# Patient Record
Sex: MALE | URBAN - METROPOLITAN AREA
[De-identification: names, ages, dates, MRNs, and addresses within clinical notes are randomized per-mention and may not be internally consistent; named-entity substitution may affect disease eponyms.]

---

## 2022-06-09 PROBLEM — Z00.00 ENCOUNTER FOR PREVENTIVE HEALTH EXAMINATION: Status: ACTIVE | Noted: 2022-06-09

## 2024-11-06 ENCOUNTER — EMERGENCY (EMERGENCY)
Facility: HOSPITAL | Age: 84
LOS: 1 days | Discharge: HOME | End: 2024-11-06
Attending: EMERGENCY MEDICINE | Admitting: EMERGENCY MEDICINE
Payer: MEDICARE

## 2024-11-06 ENCOUNTER — NON-APPOINTMENT (OUTPATIENT)
Age: 84
End: 2024-11-06

## 2024-11-06 ENCOUNTER — APPOINTMENT (OUTPATIENT)
Dept: OTOLARYNGOLOGY | Facility: CLINIC | Age: 84
End: 2024-11-06
Payer: MEDICARE

## 2024-11-06 VITALS
DIASTOLIC BLOOD PRESSURE: 78 MMHG | SYSTOLIC BLOOD PRESSURE: 159 MMHG | TEMPERATURE: 98 F | HEART RATE: 79 BPM | OXYGEN SATURATION: 97 % | RESPIRATION RATE: 18 BRPM

## 2024-11-06 VITALS
HEIGHT: 69 IN | SYSTOLIC BLOOD PRESSURE: 163 MMHG | DIASTOLIC BLOOD PRESSURE: 88 MMHG | HEART RATE: 76 BPM | TEMPERATURE: 98 F | RESPIRATION RATE: 20 BRPM | WEIGHT: 184.97 LBS | OXYGEN SATURATION: 98 %

## 2024-11-06 VITALS
WEIGHT: 185 LBS | HEIGHT: 69 IN | HEART RATE: 81 BPM | BODY MASS INDEX: 27.4 KG/M2 | DIASTOLIC BLOOD PRESSURE: 95 MMHG | SYSTOLIC BLOOD PRESSURE: 172 MMHG

## 2024-11-06 DIAGNOSIS — D68.9 POISONING BY ANTICOAGULANT ANTAGONISTS, VITAMIN K AND OTHER COAGULANTS, ACCIDENTAL (UNINTENTIONAL), INITIAL ENCOUNTER: ICD-10-CM

## 2024-11-06 DIAGNOSIS — I95.81 POSTPROCEDURAL HYPOTENSION: ICD-10-CM

## 2024-11-06 DIAGNOSIS — J31.0 CHRONIC RHINITIS: ICD-10-CM

## 2024-11-06 DIAGNOSIS — H65.02 ACUTE SEROUS OTITIS MEDIA, LEFT EAR: ICD-10-CM

## 2024-11-06 DIAGNOSIS — H93.8X9 OTHER SPECIFIED DISORDERS OF EAR, UNSPECIFIED EAR: ICD-10-CM

## 2024-11-06 DIAGNOSIS — R04.0 EPISTAXIS: ICD-10-CM

## 2024-11-06 DIAGNOSIS — H69.93 UNSPECIFIED EUSTACHIAN TUBE DISORDER, BILATERAL: ICD-10-CM

## 2024-11-06 DIAGNOSIS — T45.7X1A POISONING BY ANTICOAGULANT ANTAGONISTS, VITAMIN K AND OTHER COAGULANTS, ACCIDENTAL (UNINTENTIONAL), INITIAL ENCOUNTER: ICD-10-CM

## 2024-11-06 DIAGNOSIS — R55 SYNCOPE AND COLLAPSE: ICD-10-CM

## 2024-11-06 DIAGNOSIS — J34.2 DEVIATED NASAL SEPTUM: ICD-10-CM

## 2024-11-06 LAB
ALBUMIN SERPL ELPH-MCNC: 3.7 G/DL — SIGNIFICANT CHANGE UP (ref 3.3–5)
ALP SERPL-CCNC: 95 U/L — SIGNIFICANT CHANGE UP (ref 40–120)
ALT FLD-CCNC: 25 U/L — SIGNIFICANT CHANGE UP (ref 12–78)
ANION GAP SERPL CALC-SCNC: 3 MMOL/L — LOW (ref 5–17)
AST SERPL-CCNC: 21 U/L — SIGNIFICANT CHANGE UP (ref 15–37)
BASOPHILS # BLD AUTO: 0.01 K/UL — SIGNIFICANT CHANGE UP (ref 0–0.2)
BASOPHILS NFR BLD AUTO: 0.2 % — SIGNIFICANT CHANGE UP (ref 0–2)
BILIRUB SERPL-MCNC: 0.4 MG/DL — SIGNIFICANT CHANGE UP (ref 0.2–1.2)
BUN SERPL-MCNC: 18 MG/DL — SIGNIFICANT CHANGE UP (ref 7–23)
CALCIUM SERPL-MCNC: 9.7 MG/DL — SIGNIFICANT CHANGE UP (ref 8.5–10.1)
CHLORIDE SERPL-SCNC: 110 MMOL/L — HIGH (ref 96–108)
CO2 SERPL-SCNC: 27 MMOL/L — SIGNIFICANT CHANGE UP (ref 22–31)
CREAT SERPL-MCNC: 1.2 MG/DL — SIGNIFICANT CHANGE UP (ref 0.5–1.3)
EGFR: 59 ML/MIN/1.73M2 — LOW
EOSINOPHIL # BLD AUTO: 0.05 K/UL — SIGNIFICANT CHANGE UP (ref 0–0.5)
EOSINOPHIL NFR BLD AUTO: 0.8 % — SIGNIFICANT CHANGE UP (ref 0–6)
GLUCOSE SERPL-MCNC: 135 MG/DL — HIGH (ref 70–99)
HCT VFR BLD CALC: 38.3 % — LOW (ref 39–50)
HGB BLD-MCNC: 13.4 G/DL — SIGNIFICANT CHANGE UP (ref 13–17)
IMM GRANULOCYTES NFR BLD AUTO: 0.5 % — SIGNIFICANT CHANGE UP (ref 0–0.9)
LYMPHOCYTES # BLD AUTO: 2.57 K/UL — SIGNIFICANT CHANGE UP (ref 1–3.3)
LYMPHOCYTES # BLD AUTO: 41.1 % — SIGNIFICANT CHANGE UP (ref 13–44)
MCHC RBC-ENTMCNC: 33.9 PG — SIGNIFICANT CHANGE UP (ref 27–34)
MCHC RBC-ENTMCNC: 35 G/DL — SIGNIFICANT CHANGE UP (ref 32–36)
MCV RBC AUTO: 97 FL — SIGNIFICANT CHANGE UP (ref 80–100)
MONOCYTES # BLD AUTO: 0.51 K/UL — SIGNIFICANT CHANGE UP (ref 0–0.9)
MONOCYTES NFR BLD AUTO: 8.1 % — SIGNIFICANT CHANGE UP (ref 2–14)
NEUTROPHILS # BLD AUTO: 3.09 K/UL — SIGNIFICANT CHANGE UP (ref 1.8–7.4)
NEUTROPHILS NFR BLD AUTO: 49.3 % — SIGNIFICANT CHANGE UP (ref 43–77)
NRBC # BLD: 0 /100 WBCS — SIGNIFICANT CHANGE UP (ref 0–0)
PLATELET # BLD AUTO: 128 K/UL — LOW (ref 150–400)
POTASSIUM SERPL-MCNC: 4.7 MMOL/L — SIGNIFICANT CHANGE UP (ref 3.5–5.3)
POTASSIUM SERPL-SCNC: 4.7 MMOL/L — SIGNIFICANT CHANGE UP (ref 3.5–5.3)
PROT SERPL-MCNC: 7.2 G/DL — SIGNIFICANT CHANGE UP (ref 6–8.3)
RBC # BLD: 3.95 M/UL — LOW (ref 4.2–5.8)
RBC # FLD: 14 % — SIGNIFICANT CHANGE UP (ref 10.3–14.5)
SODIUM SERPL-SCNC: 140 MMOL/L — SIGNIFICANT CHANGE UP (ref 135–145)
WBC # BLD: 6.26 K/UL — SIGNIFICANT CHANGE UP (ref 3.8–10.5)
WBC # FLD AUTO: 6.26 K/UL — SIGNIFICANT CHANGE UP (ref 3.8–10.5)

## 2024-11-06 PROCEDURE — 92550 TYMPANOMETRY & REFLEX THRESH: CPT

## 2024-11-06 PROCEDURE — 93010 ELECTROCARDIOGRAM REPORT: CPT

## 2024-11-06 PROCEDURE — 99284 EMERGENCY DEPT VISIT MOD MDM: CPT

## 2024-11-06 PROCEDURE — 99204 OFFICE O/P NEW MOD 45 MIN: CPT | Mod: 25

## 2024-11-06 PROCEDURE — 92557 COMPREHENSIVE HEARING TEST: CPT

## 2024-11-06 PROCEDURE — 31231 NASAL ENDOSCOPY DX: CPT

## 2024-11-06 RX ORDER — TADALAFIL 2.5 MG/1
TABLET, FILM COATED ORAL
Refills: 0 | Status: ACTIVE | COMMUNITY

## 2024-11-06 RX ORDER — KRILL/OM-3/DHA/EPA/PHOSPHO/AST 1000-230MG
CAPSULE ORAL
Refills: 0 | Status: ACTIVE | COMMUNITY

## 2024-11-06 RX ORDER — ATORVASTATIN CALCIUM 80 MG/1
TABLET, FILM COATED ORAL
Refills: 0 | Status: ACTIVE | COMMUNITY

## 2024-11-06 RX ORDER — AZELASTINE HYDROCHLORIDE 137 UG/1
137 SPRAY, METERED NASAL TWICE DAILY
Qty: 1 | Refills: 5 | Status: ACTIVE | COMMUNITY
Start: 2024-11-06 | End: 1900-01-01

## 2024-11-06 RX ORDER — FLUTICASONE PROPIONATE 50 UG/1
50 SPRAY, METERED NASAL
Qty: 1 | Refills: 6 | Status: ACTIVE | COMMUNITY
Start: 2024-11-06 | End: 1900-01-01

## 2024-11-06 RX ORDER — METHYLPREDNISOLONE 4 MG/1
4 TABLET ORAL
Qty: 1 | Refills: 0 | Status: ACTIVE | COMMUNITY
Start: 2024-11-06 | End: 1900-01-01

## 2024-11-06 RX ORDER — SODIUM CHLORIDE 9 MG/ML
1000 INJECTION, SOLUTION INTRAMUSCULAR; INTRAVENOUS; SUBCUTANEOUS ONCE
Refills: 0 | Status: COMPLETED | OUTPATIENT
Start: 2024-11-06 | End: 2024-11-06

## 2024-11-06 RX ORDER — LOSARTAN POTASSIUM 100 MG/1
TABLET, FILM COATED ORAL
Refills: 0 | Status: ACTIVE | COMMUNITY

## 2024-11-06 RX ADMIN — SODIUM CHLORIDE 1000 MILLILITER(S): 9 INJECTION, SOLUTION INTRAMUSCULAR; INTRAVENOUS; SUBCUTANEOUS at 10:36

## 2024-11-06 NOTE — ED ADULT NURSE NOTE - OBJECTIVE STATEMENT
84y M  pt BIBEMS to ED, a&ox3, clear speech. per EMS, pt syncopized/feinted at ENT office after nasal rinse. office unable to get BP and called EMS. pt verbally denies any sx in ER, normotensive- VSS. pt denies CP/palpitations, SOB, dizziness, blurry vision, N/V/D.

## 2024-11-06 NOTE — ED ADULT NURSE NOTE - ED CARDIAC CAPILLARY REFILL
The patient was instructed to follow our dietary recommendations regarding a high lean protein, low carb and low fat diet. Reviewed appropriate amount of water to intake daily. Be sure to take vitamins as recommended. Patient was instructed to get at least 150 minutes of exercise weekly including both cardio and strength training.    right radial tunnel release 2 seconds or less

## 2024-11-06 NOTE — ED PROVIDER NOTE - PATIENT PORTAL LINK FT
You can access the FollowMyHealth Patient Portal offered by [facility] by registering at the following website: [email or web address]. By joining [facility]’s FollowMyHealth portal, you will also be able to view your health information using other applications (apps) compatible with our system.

## 2024-11-06 NOTE — ED PROVIDER NOTE - CARE PROVIDER_API CALL
BALTAZAR JIMENEZ  [address] [place] [id number]  Phone: [phone]  Fax: [phone]  Follow Up Time:     Zoey Rollins  Cardiology  [address] [place] [address]  Phone: [phone]  Fax: [phone]  Follow Up Time:

## 2024-11-06 NOTE — ED ADULT NURSE NOTE - NSFALLHARMRISKINTERV_ED_ALL_ED
Assistance OOB with selected safe patient handling equipment if applicable/Communicate risk of Fall with Harm to all staff, patient, and family/Orthostatic vital signs/Provide visual cue: red socks, yellow wristband, yellow gown, etc/Reinforce activity limits and safety measures with patient and family/Bed in lowest position, wheels locked, appropriate side rails in place/Call bell, personal items and telephone in reach/Instruct patient to call for assistance before getting out of bed/chair/stretcher/Non-slip footwear applied when patient is off stretcher/[place] to call system/Physically safe environment - no spills, clutter or unnecessary equipment/Purposeful Proactive Rounding/Room/bathroom lighting operational, light cord in reach

## 2024-11-06 NOTE — ED ADULT NURSE NOTE - CHIEF COMPLAINT QUOTE
biba ([place]) from ENT office. patient had a vasovagal response while getting ears cleaned and per ENT office, they couldn't get a bp   they called 911 as a cardiac arrest.  once ems arrived, the bp was systolic 150's and he was alert.  patient states he feels fine now but states he did feel dizzy prior to syncopal episode.  alert / oriented x4.

## 2024-11-06 NOTE — ED PROVIDER NOTE - PROGRESS NOTE DETAILS
Patient doing well, no acute complaints at this time.  Patient is feeling improved, has been asymptomatic since arrival.  The patient wishes to be discharged now, he does not want a wait for any further workup.  The patient understands the risk of near syncope, importance of close prompt follow-up with his primary care doctor, and to return with any acute changes or concerns.

## 2024-11-06 NOTE — ED PROVIDER NOTE - OBJECTIVE STATEMENT
84-year-old male with a history of hypertension, high cholesterol presents with was having a exam at ENT office today.  At that time he was having a scope of his nose which was uncomfortable.  The patient states he was very surprised as he did not realize that that was going to be happening.  The patient then became lightheaded and dizzy.  The patient never fully passed out.  EMS reports that the blood pressure was in the 50s to 60s systolically by the staff at the office.  The patient states that after about 10 to 15 minutes, the symptoms resolved and he is now asymptomatic.  The patient never had any chest pain or shortness of breath.  No palpitations or heart racing.  No numbness or tingling.  No focal weakness.  The patient has been his usual state of health.  No recent dyspnea on exertion or easy fatigue.  No recent chest pain.  No acute vomiting or diarrhea.  No aggravating or alleviating factors otherwise noted.  No other acute complaints.

## 2024-11-06 NOTE — ED PROVIDER NOTE - NSFOLLOWUPINSTRUCTIONS_ED_ALL_ED_FT
1. Follow-up with your Primary Medical Doctor or referred doctor. Call today / next business day for prompt follow-up.  2. Follow-up with Cardiology as discussed. Call today / next business day for prompt follow-up and further workup and evaluation.  3. Return to Emergency room for any worsening or persistent pain, shortness of breath, weakness, fever, abdominal pain, dizziness, passing out, unexplained pain in arms, legs, back, any vomiting, feeling like your heart is racing, any further episodes of dizziness or lightheadedness, or any other concerning symptoms.  4. See attached instruction sheets for additional information, including information regarding signs and symptoms to look out for, reasons to seek immediate care and other important instructions.

## 2024-11-06 NOTE — ED ADULT TRIAGE NOTE - CHIEF COMPLAINT QUOTE
biba ([place]) from ENT office. patient had a vasovagal response while getting ears cleaned and per ENT office, they couldn't get a bp   they called 911 as a cardiac arrest.  once ems arrived, the bp was systolic 150's and he was alert.  patient states he feels fine now but states he did feel dizzy prior to syncopal episode.  alert / oriented x4.

## 2024-11-06 NOTE — ED PROVIDER NOTE - CARE PROVIDERS DIRECT ADDRESSES
[email or web address]

## 2024-11-20 PROCEDURE — 99283 EMERGENCY DEPT VISIT LOW MDM: CPT

## 2024-11-20 PROCEDURE — 80053 COMPREHEN METABOLIC PANEL: CPT

## 2024-11-20 PROCEDURE — 93005 ELECTROCARDIOGRAM TRACING: CPT

## 2024-11-20 PROCEDURE — 85025 COMPLETE CBC W/AUTO DIFF WBC: CPT

## 2024-11-20 PROCEDURE — 36415 COLL VENOUS BLD VENIPUNCTURE: CPT

## 2024-11-20 PROCEDURE — 82962 GLUCOSE BLOOD TEST: CPT

## 2024-12-03 NOTE — ED PROVIDER NOTE - CLINICAL SUMMARY MEDICAL DECISION MAKING FREE TEXT BOX
Acute near syncope during a painful ENT procedure.  Patient never had chest pain or shortness of breath.  Symptoms have resolved, with an normalization of his blood pressure.  Will check labs, IV fluids, reeval 36.8